# Patient Record
Sex: MALE | Race: WHITE | URBAN - METROPOLITAN AREA
[De-identification: names, ages, dates, MRNs, and addresses within clinical notes are randomized per-mention and may not be internally consistent; named-entity substitution may affect disease eponyms.]

---

## 2022-10-17 ENCOUNTER — OFFICE VISIT (OUTPATIENT)
Dept: URBAN - METROPOLITAN AREA CLINIC 64 | Facility: CLINIC | Age: 42
End: 2022-10-17
Payer: COMMERCIAL

## 2022-10-17 DIAGNOSIS — H52.13 MYOPIA, BILATERAL: Primary | ICD-10-CM

## 2022-10-17 DIAGNOSIS — H40.013 OPEN ANGLE WITH BORDERLINE FINDINGS, LOW RISK, BILATERAL: ICD-10-CM

## 2022-10-17 PROCEDURE — 99204 OFFICE O/P NEW MOD 45 MIN: CPT | Performed by: OPTOMETRIST

## 2022-10-17 ASSESSMENT — VISUAL ACUITY
OD: 20/20
OS: 20/20

## 2022-10-17 ASSESSMENT — INTRAOCULAR PRESSURE
OD: 17
OS: 19

## 2022-10-17 ASSESSMENT — KERATOMETRY
OD: 44.37
OS: 44.52

## 2022-10-17 NOTE — IMPRESSION/PLAN
Impression: Open angle with borderline findings, low risk, bilateral: H40.013. Plan: IOP OD:17 OS:19 
possible Family hx Glaucoma suspect by physiological cupping. Recommend RTC for HVF 24-2/RNFL OCT & IOP check 3 months.